# Patient Record
Sex: FEMALE | Race: WHITE | URBAN - METROPOLITAN AREA
[De-identification: names, ages, dates, MRNs, and addresses within clinical notes are randomized per-mention and may not be internally consistent; named-entity substitution may affect disease eponyms.]

---

## 2017-05-15 ENCOUNTER — IMPORTED ENCOUNTER (OUTPATIENT)
Dept: URBAN - METROPOLITAN AREA CLINIC 50 | Facility: CLINIC | Age: 47
End: 2017-05-15

## 2018-08-10 NOTE — PATIENT DISCUSSION
PATIENT'S VISUAL FIELD HAD CHANGES IN THE LEFT EYE. DISCUSSED OPTION OF STARTING A DROP VS SLT OU.   PATIENT WISHES TO START A DROP WILL PRESCRIBE LUMIGAN OU QHS

## 2018-08-10 NOTE — PATIENT DISCUSSION
Primary Open Angle Glaucoma Counseling:  I have explained the diagnosis of  glaucoma and discussed the importance of lowering intraocular pressure to prevent further optic nerve damage and possible blindness. I have discussed the various treatment options including medications, SLT laser and surgery. I have reviewed the regimen of drops with the patient. I emphasized to the patient the importance of compliance with treatment and follow-up appointments. Patient instructed to return for follow-up as scheduled.

## 2021-04-17 ASSESSMENT — TONOMETRY
OS_IOP_MMHG: 11
OD_IOP_MMHG: 12

## 2021-04-17 ASSESSMENT — VISUAL ACUITY
OS_SC: 20/20-2
OD_SC: 20/20-2